# Patient Record
Sex: MALE | Race: BLACK OR AFRICAN AMERICAN | NOT HISPANIC OR LATINO
[De-identification: names, ages, dates, MRNs, and addresses within clinical notes are randomized per-mention and may not be internally consistent; named-entity substitution may affect disease eponyms.]

---

## 2021-04-29 PROBLEM — Z00.00 ENCOUNTER FOR PREVENTIVE HEALTH EXAMINATION: Status: ACTIVE | Noted: 2021-04-29

## 2021-05-04 ENCOUNTER — APPOINTMENT (OUTPATIENT)
Dept: NEPHROLOGY | Facility: CLINIC | Age: 51
End: 2021-05-04
Payer: MEDICAID

## 2021-05-04 VITALS
OXYGEN SATURATION: 97 % | WEIGHT: 171 LBS | RESPIRATION RATE: 62 BRPM | HEART RATE: 16 BPM | BODY MASS INDEX: 24.48 KG/M2 | HEIGHT: 70 IN | DIASTOLIC BLOOD PRESSURE: 74 MMHG | TEMPERATURE: 98 F | SYSTOLIC BLOOD PRESSURE: 122 MMHG

## 2021-05-04 DIAGNOSIS — Z78.9 OTHER SPECIFIED HEALTH STATUS: ICD-10-CM

## 2021-05-04 DIAGNOSIS — E55.9 VITAMIN D DEFICIENCY, UNSPECIFIED: ICD-10-CM

## 2021-05-04 DIAGNOSIS — H91.92 UNSPECIFIED HEARING LOSS, LEFT EAR: ICD-10-CM

## 2021-05-04 DIAGNOSIS — K46.9 UNSPECIFIED ABDOMINAL HERNIA W/OUT OBSTRUCTION OR GANGRENE: ICD-10-CM

## 2021-05-04 DIAGNOSIS — D63.1 CHRONIC KIDNEY DISEASE, UNSPECIFIED: ICD-10-CM

## 2021-05-04 DIAGNOSIS — Z86.69 PERSONAL HISTORY OF OTHER DISEASES OF THE NERVOUS SYSTEM AND SENSE ORGANS: ICD-10-CM

## 2021-05-04 DIAGNOSIS — N18.9 CHRONIC KIDNEY DISEASE, UNSPECIFIED: ICD-10-CM

## 2021-05-04 DIAGNOSIS — Z86.19 PERSONAL HISTORY OF OTHER INFECTIOUS AND PARASITIC DISEASES: ICD-10-CM

## 2021-05-04 DIAGNOSIS — B35.1 TINEA UNGUIUM: ICD-10-CM

## 2021-05-04 PROCEDURE — 99243 OFF/OP CNSLTJ NEW/EST LOW 30: CPT

## 2021-05-04 NOTE — HISTORY OF PRESENT ILLNESS
[FreeTextEntry1] : Maria E Valle is a 50-year old gentleman originally from [place] who is currently referred because of an elevated serum creatinine.  He is here today with his wife, Tiffanie.  Mr. Valle is here because of the following BUN/creatinine trend:  22/1.78 (2018), 25/1.61 (2021).  Mr. Valle has a history of CKD and vitamin D deficiency.  He is hard of hearing.  He has no history of diabetes mellitus, hypertension, heart disease or hyperlipidemia.  he takes no pain medications.  His full ROS is below. \par \par Mr. Valle's mother  when he was 6 years old. She was ill.  His father  in .  He did not know his father is is unaware of his father's medical history. He has one sister,  2 half brothers and 1 half sister from his father.  They are all healthy.  His full sister has hearing difficulty.

## 2021-05-04 NOTE — CONSULT LETTER
[Dear  ___] : Dear  [unfilled], [Consult Letter:] : I had the pleasure of evaluating your patient, [unfilled]. [Please see my note below.] : Please see my note below. [Consult Closing:] : Thank you very much for allowing me to participate in the care of this patient.  If you have any questions, please do not hesitate to contact me. [Sincerely,] : Sincerely, [FreeTextEntry3] : Michael Olguin

## 2021-05-04 NOTE — REVIEW OF SYSTEMS
[Loss Of Hearing] : hearing loss [Shortness Of Breath] : no shortness of breath [Cough] : no cough [SOB on Exertion] : no shortness of breath during exertion [Abdominal Pain] : no abdominal pain [Vomiting] : no vomiting [Constipation] : no constipation [Diarrhea] : no diarrhea [Melena] : no melena [Dysuria] : no dysuria [Incontinence] : no incontinence [Hesitancy] : no urinary hesitancy [Nocturia] : nocturia [As Noted in HPI] : as noted in HPI [Negative] : Endocrine [FreeTextEntry2] : Has had "sweats at night"- he was always "soaking wet when he sleeps"- this resolved around January 2021. Lost "a little weight and then gained weight back over the last 3-4 weeks. [FreeTextEntry4] : since a young age [FreeTextEntry3] : Glaucoma [FreeTextEntry8] : nocturia 3, good stream, no hesitancy [de-identified] : orthostatic dizziness "it doesn't happen frequently"

## 2021-05-04 NOTE — PHYSICAL EXAM
[General Appearance - Alert] : alert [General Appearance - In No Acute Distress] : in no acute distress [General Appearance - Well Nourished] : well nourished [General Appearance - Well Developed] : well developed [General Appearance - Well-Appearing] : healthy appearing [Sclera] : the sclera and conjunctiva were normal [Extraocular Movements] : extraocular movements were intact [Neck Appearance] : the appearance of the neck was normal [] : no respiratory distress [Respiration, Rhythm And Depth] : normal respiratory rhythm and effort [Exaggerated Use Of Accessory Muscles For Inspiration] : no accessory muscle use [Auscultation Breath Sounds / Voice Sounds] : lungs were clear to auscultation bilaterally [Heart Rate And Rhythm] : heart rate was normal and rhythm regular [Heart Sounds] : normal S1 and S2 [Heart Sounds Gallop] : no gallops [Murmurs] : no murmurs [Heart Sounds Pericardial Friction Rub] : no pericardial rub [Edema] : there was no peripheral edema [Full Pulse] : the pedal pulses are present [FreeTextEntry1] : radial pulses 2+ with equal timing [Bowel Sounds] : normal bowel sounds [Abdomen Soft] : soft [No CVA Tenderness] : no ~M costovertebral angle tenderness [Abdomen Tenderness] : non-tender [No Spinal Tenderness] : no spinal tenderness [Abnormal Walk] : normal gait [Nail Clubbing] : no clubbing  or cyanosis of the fingernails [Involuntary Movements] : no involuntary movements were seen [Skin Turgor] : normal skin turgor [No Focal Deficits] : no focal deficits [Oriented To Time, Place, And Person] : oriented to person, place, and time [Impaired Insight] : insight and judgment were intact [Affect] : the affect was normal [Mood] : the mood was normal

## 2021-07-06 ENCOUNTER — APPOINTMENT (OUTPATIENT)
Dept: NEPHROLOGY | Facility: CLINIC | Age: 51
End: 2021-07-06

## 2021-07-12 ENCOUNTER — APPOINTMENT (OUTPATIENT)
Dept: NEPHROLOGY | Facility: CLINIC | Age: 51
End: 2021-07-12
Payer: MEDICAID

## 2021-07-12 VITALS
SYSTOLIC BLOOD PRESSURE: 118 MMHG | RESPIRATION RATE: 57 BRPM | HEIGHT: 60 IN | BODY MASS INDEX: 32.59 KG/M2 | WEIGHT: 166 LBS | DIASTOLIC BLOOD PRESSURE: 74 MMHG | TEMPERATURE: 98 F | HEART RATE: 16 BPM | OXYGEN SATURATION: 100 %

## 2021-07-12 DIAGNOSIS — R61 GENERALIZED HYPERHIDROSIS: ICD-10-CM

## 2021-07-12 DIAGNOSIS — R63.4 ABNORMAL WEIGHT LOSS: ICD-10-CM

## 2021-07-12 DIAGNOSIS — N18.30 CHRONIC KIDNEY DISEASE, STAGE 3 UNSPECIFIED: ICD-10-CM

## 2021-07-12 DIAGNOSIS — R80.9 PROTEINURIA, UNSPECIFIED: ICD-10-CM

## 2021-07-12 PROCEDURE — 99214 OFFICE O/P EST MOD 30 MIN: CPT

## 2021-07-12 NOTE — HISTORY OF PRESENT ILLNESS
[FreeTextEntry1] : Maria E Valle is a 51-year old gentleman originally from [place] who was referred because of an elevated serum creatinine.  He is here today with his wife, Tiffanie.  Mr. Valle is here because of the following BUN/creatinine trend:  22/1.78 (2018), 25/1.61 (2021), 26/1.86 (2021).   Mr. Valle has a history of CKD and vitamin D deficiency.  He is hard of hearing.  He has no history of diabetes mellitus, hypertension, heart disease or hyperlipidemia.  he takes no pain medications. \par \par Mr. Valle's mother  when he was 6 years old. She was ill.  His father  in .  He did not know his father is is unaware of his father's medical history. He has one sister,  2 half brothers and 1 half sister from his father.  They are all healthy.  His full sister has hearing difficulty. \par \par Mr. Valle is here for follow up.  He has remain well since his first visit with me in May 2021.  He was told he has "heart enlargement" at the [facility].  He was then sent to [place] for additional testing. He was then sent to the ED at [facility].  He was told "his heart isn't functioning right".  He has follow up at [facility].\par

## 2021-07-12 NOTE — CONSULT LETTER
[Dear  ___] : Dear  [unfilled], [Consult Letter:] : I had the pleasure of evaluating your patient, [unfilled]. [Please see my note below.] : Please see my note below. [Consult Closing:] : Thank you very much for allowing me to participate in the care of this patient.  If you have any questions, please do not hesitate to contact me. [Sincerely,] : Sincerely, [FreeTextEntry3] : Michael Olguin [DrJon  ___] : Dr. SAENZ

## 2021-07-12 NOTE — REVIEW OF SYSTEMS
[Loss Of Hearing] : hearing loss [Shortness Of Breath] : no shortness of breath [Cough] : no cough [SOB on Exertion] : no shortness of breath during exertion [Abdominal Pain] : no abdominal pain [Vomiting] : no vomiting [Constipation] : no constipation [Diarrhea] : no diarrhea [Melena] : no melena [Dysuria] : no dysuria [Incontinence] : no incontinence [Hesitancy] : no urinary hesitancy [Nocturia] : nocturia [As Noted in HPI] : as noted in HPI [Negative] : Endocrine [FreeTextEntry2] : Has had "sweats at night"- he was always "soaking wet when he sleeps"- this resolved around January 2021. Lost weight since his last vistt with me. [FreeTextEntry3] : Glaucoma [FreeTextEntry4] : since a young age [FreeTextEntry8] : nocturia x 3-4, good stream, no hesitancy [de-identified] : orthostatic dizziness "it doesn't happen frequently"

## 2021-07-12 NOTE — PHYSICAL EXAM
[General Appearance - Alert] : alert [General Appearance - In No Acute Distress] : in no acute distress [General Appearance - Well Nourished] : well nourished [General Appearance - Well Developed] : well developed [General Appearance - Well-Appearing] : healthy appearing [Sclera] : the sclera and conjunctiva were normal [Extraocular Movements] : extraocular movements were intact [Neck Appearance] : the appearance of the neck was normal [] : no respiratory distress [Respiration, Rhythm And Depth] : normal respiratory rhythm and effort [Exaggerated Use Of Accessory Muscles For Inspiration] : no accessory muscle use [Auscultation Breath Sounds / Voice Sounds] : lungs were clear to auscultation bilaterally [Heart Rate And Rhythm] : heart rate was normal and rhythm regular [Heart Sounds] : normal S1 and S2 [Heart Sounds Gallop] : no gallops [Murmurs] : no murmurs [Heart Sounds Pericardial Friction Rub] : no pericardial rub [Full Pulse] : the pedal pulses are present [Edema] : there was no peripheral edema [FreeTextEntry1] : radial pulses 2+ with equal timing [Bowel Sounds] : normal bowel sounds [Abdomen Soft] : soft [Abdomen Tenderness] : non-tender [No CVA Tenderness] : no ~M costovertebral angle tenderness [No Spinal Tenderness] : no spinal tenderness [Abnormal Walk] : normal gait [Involuntary Movements] : no involuntary movements were seen [Skin Turgor] : normal skin turgor [No Focal Deficits] : no focal deficits [Oriented To Time, Place, And Person] : oriented to person, place, and time [Impaired Insight] : insight and judgment were intact [Affect] : the affect was normal [Mood] : the mood was normal

## 2021-07-12 NOTE — ASSESSMENT
[FreeTextEntry1] : Maria E Valle is a 50-year old gentleman originally from [place] who is currently referred for evaluation of  the following BUN/creatinine trend:  22/1.78 (2018), 25/1.61 (2021).  Mr. Valle has a history of CKD and vitamin D deficiency.  He is hard of hearing.  He has no history of diabetes mellitus, hypertension, heart disease or hyperlipidemia.  he takes no pain medications.  Both Mr. Valle and his sister have a history of being hard of hearing.  His mother  at a young age and Mr. Valle did not know his father. \par \par Mr. Valle had a CT scan of the abdomen at [facility] in 2018 which demonstrated a 2.2 cm hypodense lesion in the lower pole of the left kidney representing either a proteinaceous or hemorrhagic cysts.  \par \par His urinalysis demonstrated 2+ proteinuria.\par \par LAB STUDIES (2021):  SPEP (no M-spike), 24-hour urine protein (782 mg, M-spike not observed), Serum immunofixation (unremarkable), hepatitis B surface antigen (-), hepatitis B core antibody total (-), \par \par 24 hour urine protein 2272 mg. creatinine 1854 mg\par \par BUN/creatinine 26/1.86, K+ 4.8 meq/L, CO2 27 meq/L, Na+ 143 meq/L\par \par HbA1c (6.2%)\par \par \par IMPRESSION:\par \par Stage III CKD with proteinuria.  The amount of proteinuria remains  unclear.  On the urine immunofixation there were 782 mg of protein.  On a separate 24 hour urine collection the proteinuria was 2272 mg.  I am unsure what to make of this. The approaches to each of these are quite different. \par \par The renal ultrasound was not done because of cost.  Ms. Valle has found a place where he can get the ultrasound done at a price he is able to afford. \par \par Other missing tests include: Hep C antibody (screen), VIRA, rheumatoid factor.\par \par \par RECOMMENDATIONS:\par \par (1) Renal ultrasound\par (2) Random urine for total protein and creatinine\par (3) Urinalysis\par (4) Hep C antibody\par (5) VIRA\par (6) Rheumatoid factor\par (7) VDRL\par (8) HIV test if appropriate\par (9) PPD +/- quantiferon gold assay\par \par Return following these lab studies\par

## 2021-08-15 DIAGNOSIS — N17.9 ACUTE KIDNEY FAILURE, UNSPECIFIED: ICD-10-CM

## 2021-08-26 ENCOUNTER — APPOINTMENT (OUTPATIENT)
Dept: NEPHROLOGY | Facility: CLINIC | Age: 51
End: 2021-08-26

## 2021-08-26 DIAGNOSIS — N28.89 OTHER SPECIFIED DISORDERS OF KIDNEY AND URETER: ICD-10-CM

## 2021-10-05 DIAGNOSIS — N23 UNSPECIFIED RENAL COLIC: ICD-10-CM

## 2023-01-10 ENCOUNTER — OFFICE (OUTPATIENT)
Dept: URBAN - METROPOLITAN AREA CLINIC 29 | Facility: CLINIC | Age: 53
Setting detail: OPHTHALMOLOGY
End: 2023-01-10

## 2023-01-10 DIAGNOSIS — H57.11: ICD-10-CM

## 2023-01-10 DIAGNOSIS — H40.1212: ICD-10-CM

## 2023-01-10 DIAGNOSIS — H40.002: ICD-10-CM

## 2023-01-10 DIAGNOSIS — H40.031: ICD-10-CM

## 2023-01-10 DIAGNOSIS — H40.032: ICD-10-CM

## 2023-01-10 DIAGNOSIS — H01.001: ICD-10-CM

## 2023-01-10 DIAGNOSIS — H01.004: ICD-10-CM

## 2023-01-10 DIAGNOSIS — H53.19: ICD-10-CM

## 2023-01-10 DIAGNOSIS — H02.402: ICD-10-CM

## 2023-01-10 DIAGNOSIS — H00.11: ICD-10-CM

## 2023-01-10 PROCEDURE — 92020 GONIOSCOPY: CPT | Performed by: OPHTHALMOLOGY

## 2023-01-10 PROCEDURE — 92012 INTRM OPH EXAM EST PATIENT: CPT | Performed by: OPHTHALMOLOGY

## 2023-01-10 ASSESSMENT — REFRACTION_AUTOREFRACTION
OD_CYLINDER: +0.25
OS_SPHERE: +3.25
OS_AXIS: 009
OD_SPHERE: +3.50
OS_CYLINDER: +0.50
OD_AXIS: 034

## 2023-01-10 ASSESSMENT — REFRACTION_MANIFEST
OD_CYLINDER: +1.00
OD_AXIS: 180
OD_VA1: 20/25
OS_ADD: +2.00
OD_SPHERE: +2.50
OS_VA1: 20/20-1
OD_ADD: +2.00
OS_SPHERE: +2.50
OD_VA1: 20/25
OS_VA1: 20/20
OD_ADD: +2.00
OD_AXIS: 180
OS_CYLINDER: +0.50
OS_AXIS: 180
OS_AXIS: 180
OD_SPHERE: +2.50
OS_ADD: +2.00
OS_CYLINDER: +0.50
OS_SPHERE: +3.25
OD_CYLINDER: +0.75

## 2023-01-10 ASSESSMENT — CORNEAL DYSTROPHY - POSTERIOR
OD_POSTERIORDYSTROPHY: T GUTTATA
OS_POSTERIORDYSTROPHY: T GUTTATA

## 2023-01-10 ASSESSMENT — REFRACTION_CURRENTRX
OD_SPHERE: +2.50
OD_OVR_VA: 20/
OS_ADD: +2.00
OS_VPRISM_DIRECTION: PROGS
OS_SPHERE: +2.50
OD_ADD: +2.00
OS_AXIS: 175
OS_OVR_VA: 20/
OD_AXIS: 175
OD_VPRISM_DIRECTION: PROGS
OS_CYLINDER: +0.50
OD_CYLINDER: +0.50

## 2023-01-10 ASSESSMENT — AXIALLENGTH_DERIVED
OS_AL: 23.5765
OD_AL: 23.2544
OS_AL: 23.2883
OD_AL: 23.0202
OD_AL: 23.3018
OS_AL: 23.2883

## 2023-01-10 ASSESSMENT — LID EXAM ASSESSMENTS
OD_BLEPHARITIS: 2+
OS_BLEPHARITIS: 2+

## 2023-01-10 ASSESSMENT — PACHYMETRY
OD_CT_UM: 568
OS_CT_UM: 558
OD_CT_CORRECTION: -1
OS_CT_CORRECTION: -1

## 2023-01-10 ASSESSMENT — SPHEQUIV_DERIVED
OD_SPHEQUIV: 3.625
OD_SPHEQUIV: 3
OS_SPHEQUIV: 3.5
OS_SPHEQUIV: 3.5
OS_SPHEQUIV: 2.75
OD_SPHEQUIV: 2.875

## 2023-01-10 ASSESSMENT — KERATOMETRY
OS_K2POWER_DIOPTERS: 41.00
OD_AXISANGLE_DEGREES: 074
OD_K1POWER_DIOPTERS: 41.00
OS_AXISANGLE_DEGREES: 008
OS_K1POWER_DIOPTERS: 40.25
OD_K2POWER_DIOPTERS: 41.50

## 2023-01-10 ASSESSMENT — CONFRONTATIONAL VISUAL FIELD TEST (CVF)
OD_FINDINGS: FULL
OS_FINDINGS: FULL

## 2023-01-10 ASSESSMENT — VISUAL ACUITY
OS_BCVA: 20/25-2
OD_BCVA: 20/20-2

## 2023-01-10 ASSESSMENT — TONOMETRY
OS_IOP_MMHG: 18
OD_IOP_MMHG: 17

## 2023-01-10 ASSESSMENT — LID POSITION - PTOSIS: OS_PTOSIS: LUL 1+

## 2023-05-02 ENCOUNTER — OFFICE (OUTPATIENT)
Dept: URBAN - METROPOLITAN AREA CLINIC 29 | Facility: CLINIC | Age: 53
Setting detail: OPHTHALMOLOGY
End: 2023-05-02

## 2023-05-02 DIAGNOSIS — H40.1212: ICD-10-CM

## 2023-05-02 DIAGNOSIS — H00.11: ICD-10-CM

## 2023-05-02 DIAGNOSIS — H01.001: ICD-10-CM

## 2023-05-02 DIAGNOSIS — H40.032: ICD-10-CM

## 2023-05-02 DIAGNOSIS — H53.19: ICD-10-CM

## 2023-05-02 DIAGNOSIS — H57.11: ICD-10-CM

## 2023-05-02 DIAGNOSIS — H02.402: ICD-10-CM

## 2023-05-02 DIAGNOSIS — H40.002: ICD-10-CM

## 2023-05-02 DIAGNOSIS — H01.004: ICD-10-CM

## 2023-05-02 DIAGNOSIS — H40.031: ICD-10-CM

## 2023-05-02 PROCEDURE — 99213 OFFICE O/P EST LOW 20 MIN: CPT | Performed by: OPHTHALMOLOGY

## 2023-05-02 PROCEDURE — 92133 CPTRZD OPH DX IMG PST SGM ON: CPT | Performed by: OPHTHALMOLOGY

## 2023-05-02 PROCEDURE — 92083 EXTENDED VISUAL FIELD XM: CPT | Performed by: OPHTHALMOLOGY

## 2023-05-02 ASSESSMENT — KERATOMETRY
OD_AXISANGLE_DEGREES: 074
OS_K2POWER_DIOPTERS: 41.00
OD_K1POWER_DIOPTERS: 41.00
OD_K2POWER_DIOPTERS: 41.50
OS_AXISANGLE_DEGREES: 008
OS_K1POWER_DIOPTERS: 40.25

## 2023-05-02 ASSESSMENT — REFRACTION_AUTOREFRACTION
OD_AXIS: 034
OS_CYLINDER: +0.50
OS_SPHERE: +3.25
OD_SPHERE: +3.50
OS_AXIS: 009
OD_CYLINDER: +0.25

## 2023-05-02 ASSESSMENT — REFRACTION_MANIFEST
OD_SPHERE: +2.50
OS_SPHERE: +2.50
OD_SPHERE: +2.50
OD_CYLINDER: +0.75
OS_CYLINDER: +0.50
OS_CYLINDER: +0.50
OD_VA1: 20/25
OS_VA1: 20/20
OD_CYLINDER: +1.00
OS_VA1: 20/20-1
OD_VA1: 20/25
OS_AXIS: 180
OD_ADD: +2.00
OS_ADD: +2.00
OD_AXIS: 180
OS_ADD: +2.00
OS_AXIS: 180
OS_SPHERE: +3.25
OD_ADD: +2.00
OD_AXIS: 180

## 2023-05-02 ASSESSMENT — REFRACTION_CURRENTRX
OS_AXIS: 175
OD_ADD: +2.00
OS_CYLINDER: +0.50
OS_VPRISM_DIRECTION: PROGS
OD_OVR_VA: 20/
OS_ADD: +2.00
OS_OVR_VA: 20/
OD_VPRISM_DIRECTION: PROGS
OD_AXIS: 175
OS_SPHERE: +2.50
OD_SPHERE: +2.50
OD_CYLINDER: +0.50

## 2023-05-02 ASSESSMENT — LID POSITION - PTOSIS: OS_PTOSIS: LUL 1+

## 2023-05-02 ASSESSMENT — LID EXAM ASSESSMENTS
OS_BLEPHARITIS: 2+
OD_BLEPHARITIS: 2+

## 2023-05-02 ASSESSMENT — TONOMETRY
OD_IOP_MMHG: 19
OS_IOP_MMHG: 19

## 2023-05-02 ASSESSMENT — PACHYMETRY
OS_CT_CORRECTION: -1
OD_CT_UM: 568
OS_CT_UM: 558
OD_CT_CORRECTION: -1

## 2023-05-02 ASSESSMENT — SPHEQUIV_DERIVED
OD_SPHEQUIV: 3
OS_SPHEQUIV: 2.75
OD_SPHEQUIV: 2.875
OS_SPHEQUIV: 3.5
OS_SPHEQUIV: 3.5
OD_SPHEQUIV: 3.625

## 2023-05-02 ASSESSMENT — AXIALLENGTH_DERIVED
OS_AL: 23.5765
OS_AL: 23.2883
OD_AL: 23.0202
OS_AL: 23.2883
OD_AL: 23.3018
OD_AL: 23.2544

## 2023-05-02 ASSESSMENT — VISUAL ACUITY
OS_BCVA: 20/30-2
OD_BCVA: 20/25-1

## 2023-05-02 ASSESSMENT — CONFRONTATIONAL VISUAL FIELD TEST (CVF)
OS_FINDINGS: FULL
OD_FINDINGS: FULL

## 2023-05-02 ASSESSMENT — CORNEAL DYSTROPHY - POSTERIOR
OD_POSTERIORDYSTROPHY: T GUTTATA
OS_POSTERIORDYSTROPHY: T GUTTATA

## 2023-06-20 ENCOUNTER — RX ONLY (RX ONLY)
Age: 53
End: 2023-06-20

## 2023-06-20 ENCOUNTER — OFFICE (OUTPATIENT)
Dept: URBAN - METROPOLITAN AREA CLINIC 29 | Facility: CLINIC | Age: 53
Setting detail: OPHTHALMOLOGY
End: 2023-06-20

## 2023-06-20 DIAGNOSIS — H01.004: ICD-10-CM

## 2023-06-20 DIAGNOSIS — H40.031: ICD-10-CM

## 2023-06-20 DIAGNOSIS — H40.002: ICD-10-CM

## 2023-06-20 DIAGNOSIS — H20.12: ICD-10-CM

## 2023-06-20 DIAGNOSIS — H01.001: ICD-10-CM

## 2023-06-20 DIAGNOSIS — H40.1212: ICD-10-CM

## 2023-06-20 DIAGNOSIS — H40.032: ICD-10-CM

## 2023-06-20 DIAGNOSIS — H02.402: ICD-10-CM

## 2023-06-20 PROBLEM — H01.00 BLEPHARITIS; RIGHT UPPER LID, LEFT UPPER LID: Status: ACTIVE | Noted: 2023-06-20

## 2023-06-20 PROCEDURE — 99213 OFFICE O/P EST LOW 20 MIN: CPT | Performed by: OPHTHALMOLOGY

## 2023-06-20 ASSESSMENT — SPHEQUIV_DERIVED
OD_SPHEQUIV: 2.875
OD_SPHEQUIV: 3
OS_SPHEQUIV: 3.5
OS_SPHEQUIV: 3.5
OS_SPHEQUIV: 3
OS_SPHEQUIV: 2.75
OD_SPHEQUIV: 3.625
OD_SPHEQUIV: 2.75

## 2023-06-20 ASSESSMENT — AXIALLENGTH_DERIVED
OD_AL: 23.3495
OS_AL: 23.2883
OD_AL: 23.2544
OS_AL: 23.4796
OS_AL: 23.2883
OD_AL: 23.3018
OS_AL: 23.5765
OD_AL: 23.0202

## 2023-06-20 ASSESSMENT — PACHYMETRY
OD_CT_CORRECTION: -1
OS_CT_CORRECTION: -1
OD_CT_UM: 568
OS_CT_UM: 558

## 2023-06-20 ASSESSMENT — REFRACTION_CURRENTRX
OD_AXIS: 175
OD_OVR_VA: 20/
OD_ADD: +2.00
OD_CYLINDER: +0.50
OS_OVR_VA: 20/
OS_ADD: +2.00
OD_SPHERE: +2.50
OS_AXIS: 175
OS_VPRISM_DIRECTION: PROGS
OS_SPHERE: +2.50
OS_CYLINDER: +0.50
OD_VPRISM_DIRECTION: PROGS

## 2023-06-20 ASSESSMENT — REFRACTION_MANIFEST
OD_VA1: 20/25
OD_CYLINDER: -0.50
OD_SPHERE: +2.50
OS_AXIS: 180
OS_ADD: +2.00
OS_CYLINDER: +0.50
OD_AXIS: 100
OS_ADD: +2.00
OS_SPHERE: +3.25
OS_SPHERE: +2.50
OD_VA1: 20/25
OD_CYLINDER: +1.00
OD_ADD: +2.00
OS_AXIS: 090
OS_VA1: 20/20
OD_ADD: +2.00
OD_SPHERE: +2.50
OS_SPHERE: +3.25
OS_VA1: 20/20-1
OS_CYLINDER: -0.50
OD_VA1: 20/25
OD_SPHERE: +3.00
OS_VA1: 20/20
OD_AXIS: 180
OS_ADD: +2.00
OD_AXIS: 180
OD_CYLINDER: +0.75
OD_ADD: +2.00
OS_AXIS: 180
OS_CYLINDER: +0.50

## 2023-06-20 ASSESSMENT — REFRACTION_AUTOREFRACTION
OS_CYLINDER: +0.50
OD_SPHERE: +3.50
OS_SPHERE: +3.25
OD_AXIS: 034
OD_CYLINDER: +0.25
OS_AXIS: 009

## 2023-06-20 ASSESSMENT — KERATOMETRY
OD_K1POWER_DIOPTERS: 41.00
OD_AXISANGLE_DEGREES: 074
OS_K2POWER_DIOPTERS: 41.00
OS_AXISANGLE_DEGREES: 008
OS_K1POWER_DIOPTERS: 40.25
OD_K2POWER_DIOPTERS: 41.50

## 2023-06-20 ASSESSMENT — VISUAL ACUITY
OS_BCVA: 20/30-2
OD_BCVA: 20/25-1

## 2023-06-20 ASSESSMENT — LID EXAM ASSESSMENTS
OS_BLEPHARITIS: 2+
OD_BLEPHARITIS: 2+

## 2023-06-20 ASSESSMENT — CONFRONTATIONAL VISUAL FIELD TEST (CVF)
OS_FINDINGS: FULL
OD_FINDINGS: FULL

## 2023-06-20 ASSESSMENT — TONOMETRY
OD_IOP_MMHG: 14
OS_IOP_MMHG: 14

## 2023-06-20 ASSESSMENT — CORNEAL DYSTROPHY - POSTERIOR
OS_POSTERIORDYSTROPHY: T GUTTATA
OD_POSTERIORDYSTROPHY: T GUTTATA

## 2023-06-20 ASSESSMENT — LID POSITION - PTOSIS: OS_PTOSIS: LUL 1+

## 2023-08-07 ENCOUNTER — OFFICE (OUTPATIENT)
Dept: URBAN - METROPOLITAN AREA CLINIC 29 | Facility: CLINIC | Age: 53
Setting detail: OPHTHALMOLOGY
End: 2023-08-07

## 2023-08-07 DIAGNOSIS — H40.002: ICD-10-CM

## 2023-08-07 DIAGNOSIS — H40.032: ICD-10-CM

## 2023-08-07 DIAGNOSIS — H02.402: ICD-10-CM

## 2023-08-07 DIAGNOSIS — H40.1212: ICD-10-CM

## 2023-08-07 DIAGNOSIS — H40.031: ICD-10-CM

## 2023-08-07 PROCEDURE — 92250 FUNDUS PHOTOGRAPHY W/I&R: CPT | Performed by: OPHTHALMOLOGY

## 2023-08-07 PROCEDURE — 99213 OFFICE O/P EST LOW 20 MIN: CPT | Performed by: OPHTHALMOLOGY

## 2023-08-07 ASSESSMENT — VISUAL ACUITY
OD_BCVA: 20/20
OS_BCVA: 20/40+1

## 2023-08-07 ASSESSMENT — AXIALLENGTH_DERIVED
OS_AL: 23.2883
OD_AL: 23.3495
OS_AL: 23.4796
OD_AL: 23.0202
OD_AL: 23.3018
OD_AL: 23.4453
OS_AL: 23.2883
OS_AL: 23.5765
OS_AL: 23.4796
OD_AL: 23.2544

## 2023-08-07 ASSESSMENT — SPHEQUIV_DERIVED
OS_SPHEQUIV: 2.75
OS_SPHEQUIV: 3.5
OD_SPHEQUIV: 3
OD_SPHEQUIV: 2.75
OS_SPHEQUIV: 3.5
OS_SPHEQUIV: 3
OD_SPHEQUIV: 2.875
OD_SPHEQUIV: 3.625
OD_SPHEQUIV: 2.5
OS_SPHEQUIV: 3

## 2023-08-07 ASSESSMENT — CORNEAL DYSTROPHY - POSTERIOR
OD_POSTERIORDYSTROPHY: T GUTTATA
OS_POSTERIORDYSTROPHY: T GUTTATA

## 2023-08-07 ASSESSMENT — REFRACTION_MANIFEST
OD_AXIS: 180
OS_ADD: +2.00
OS_ADD: +2.00
OD_SPHERE: +2.50
OD_ADD: +2.00
OD_AXIS: 180
OD_ADD: +2.00
OS_AXIS: 180
OD_SPHERE: +2.50
OS_VA1: 20/20
OD_AXIS: 010
OS_ADD: +2.00
OS_AXIS: 180
OD_VA1: 20/25
OS_CYLINDER: +0.50
OD_CYLINDER: +0.75
OS_SPHERE: +2.75
OD_SPHERE: +2.50
OS_SPHERE: +2.50
OS_CYLINDER: +0.50
OD_VA1: 20/25
OD_CYLINDER: +0.50
OD_AXIS: 010
OD_VA1: 20/25
OS_SPHERE: +3.25
OD_CYLINDER: +1.00
OS_CYLINDER: +0.50
OS_VA1: 20/20
OS_CYLINDER: +0.50
OD_CYLINDER: +0.50
OS_AXIS: 180
OS_VA1: 20/20-1
OS_ADD: +2.00
OD_ADD: +2.00
OD_VA1: 20/25-1
OD_SPHERE: +2.25
OS_SPHERE: +2.75
OS_VA1: 20/20
OS_AXIS: 180
OD_ADD: +2.00

## 2023-08-07 ASSESSMENT — REFRACTION_CURRENTRX
OS_ADD: +2.00
OD_OVR_VA: 20/
OS_CYLINDER: +0.50
OS_OVR_VA: 20/
OD_SPHERE: +2.50
OD_ADD: +2.00
OD_VPRISM_DIRECTION: PROGS
OS_VPRISM_DIRECTION: PROGS
OD_AXIS: 175
OD_CYLINDER: +0.50
OS_SPHERE: +2.50
OS_AXIS: 175

## 2023-08-07 ASSESSMENT — PACHYMETRY
OD_CT_UM: 568
OD_CT_CORRECTION: -1
OS_CT_UM: 558
OS_CT_CORRECTION: -1

## 2023-08-07 ASSESSMENT — REFRACTION_AUTOREFRACTION
OS_CYLINDER: +0.50
OD_SPHERE: +3.50
OS_SPHERE: +3.25
OD_CYLINDER: +0.25
OS_AXIS: 009
OD_AXIS: 034

## 2023-08-07 ASSESSMENT — LID POSITION - PTOSIS: OS_PTOSIS: LUL 1+

## 2023-08-07 ASSESSMENT — TONOMETRY
OD_IOP_MMHG: 15
OS_IOP_MMHG: 14

## 2023-08-07 ASSESSMENT — CONFRONTATIONAL VISUAL FIELD TEST (CVF)
OS_FINDINGS: FULL
OD_FINDINGS: FULL

## 2023-08-07 ASSESSMENT — KERATOMETRY
OS_K1POWER_DIOPTERS: 40.25
OS_K2POWER_DIOPTERS: 41.00
OD_K1POWER_DIOPTERS: 41.00
OS_AXISANGLE_DEGREES: 008
OD_K2POWER_DIOPTERS: 41.50
OD_AXISANGLE_DEGREES: 074

## 2023-08-07 ASSESSMENT — LID EXAM ASSESSMENTS
OD_BLEPHARITIS: 2+
OS_BLEPHARITIS: 2+

## 2023-12-11 ENCOUNTER — OFFICE (OUTPATIENT)
Dept: URBAN - METROPOLITAN AREA CLINIC 29 | Facility: CLINIC | Age: 53
Setting detail: OPHTHALMOLOGY
End: 2023-12-11

## 2023-12-11 DIAGNOSIS — H40.033: ICD-10-CM

## 2023-12-11 DIAGNOSIS — H40.031: ICD-10-CM

## 2023-12-11 DIAGNOSIS — H40.032: ICD-10-CM

## 2023-12-11 DIAGNOSIS — H02.402: ICD-10-CM

## 2023-12-11 DIAGNOSIS — H40.2212: ICD-10-CM

## 2023-12-11 DIAGNOSIS — H40.2221: ICD-10-CM

## 2023-12-11 PROCEDURE — 99212 OFFICE O/P EST SF 10 MIN: CPT | Performed by: OPHTHALMOLOGY

## 2023-12-11 PROCEDURE — 92020 GONIOSCOPY: CPT | Performed by: OPHTHALMOLOGY

## 2023-12-11 ASSESSMENT — REFRACTION_MANIFEST
OD_CYLINDER: +0.50
OD_CYLINDER: +0.50
OS_AXIS: 180
OD_VA1: 20/25
OS_AXIS: 180
OS_VA1: 20/20
OD_SPHERE: +2.25
OS_SPHERE: +3.25
OD_ADD: +2.00
OD_AXIS: 010
OD_SPHERE: +2.50
OS_CYLINDER: +0.50
OS_ADD: +2.00
OS_VA1: 20/20-1
OD_VA1: 20/25
OS_SPHERE: +2.75
OS_SPHERE: +2.75
OD_ADD: +2.00
OS_SPHERE: +2.50
OS_AXIS: 180
OS_CYLINDER: +0.50
OD_AXIS: 180
OD_VA1: 20/25
OS_ADD: +2.00
OS_CYLINDER: +0.50
OS_VA1: 20/20
OS_ADD: +2.00
OS_AXIS: 180
OD_VA1: 20/25-1
OD_ADD: +2.00
OD_CYLINDER: +0.75
OD_AXIS: 010
OS_ADD: +2.00
OS_CYLINDER: +0.50
OD_AXIS: 180
OD_SPHERE: +2.50
OD_SPHERE: +2.50
OS_VA1: 20/20
OD_CYLINDER: +1.00
OD_ADD: +2.00

## 2023-12-11 ASSESSMENT — CORNEAL DYSTROPHY - POSTERIOR
OS_POSTERIORDYSTROPHY: T GUTTATA
OD_POSTERIORDYSTROPHY: T GUTTATA

## 2023-12-11 ASSESSMENT — SPHEQUIV_DERIVED
OD_SPHEQUIV: 2.5
OD_SPHEQUIV: 3
OS_SPHEQUIV: 3
OS_SPHEQUIV: 3
OD_SPHEQUIV: 2.875
OD_SPHEQUIV: 3.625
OS_SPHEQUIV: 2.75
OS_SPHEQUIV: 3.5
OS_SPHEQUIV: 3.5
OD_SPHEQUIV: 2.75

## 2023-12-11 ASSESSMENT — REFRACTION_AUTOREFRACTION
OD_AXIS: 034
OS_SPHERE: +3.25
OD_SPHERE: +3.50
OD_CYLINDER: +0.25
OS_CYLINDER: +0.50
OS_AXIS: 009

## 2023-12-11 ASSESSMENT — REFRACTION_CURRENTRX
OS_SPHERE: +2.50
OS_ADD: +2.00
OD_ADD: +2.00
OD_SPHERE: +2.50
OS_AXIS: 175
OD_CYLINDER: +0.50
OS_OVR_VA: 20/
OD_OVR_VA: 20/
OS_VPRISM_DIRECTION: PROGS
OD_VPRISM_DIRECTION: PROGS
OS_CYLINDER: +0.50
OD_AXIS: 175

## 2023-12-11 ASSESSMENT — LID POSITION - PTOSIS: OS_PTOSIS: LUL 1+

## 2023-12-11 ASSESSMENT — CONFRONTATIONAL VISUAL FIELD TEST (CVF)
OS_FINDINGS: FULL
OD_FINDINGS: FULL

## 2023-12-11 ASSESSMENT — LID EXAM ASSESSMENTS
OD_BLEPHARITIS: 2+
OS_BLEPHARITIS: 2+

## 2024-02-15 ENCOUNTER — OFFICE (OUTPATIENT)
Dept: URBAN - METROPOLITAN AREA CLINIC 29 | Facility: CLINIC | Age: 54
Setting detail: OPHTHALMOLOGY
End: 2024-02-15

## 2024-02-15 DIAGNOSIS — H40.2212: ICD-10-CM

## 2024-02-15 PROCEDURE — 66761 REVISION OF IRIS: CPT | Mod: RT | Performed by: OPHTHALMOLOGY

## 2024-02-15 ASSESSMENT — REFRACTION_CURRENTRX
OS_CYLINDER: +0.25
OS_CYLINDER: +0.50
OD_AXIS: 3
OD_AXIS: 175
OS_AXIS: 175
OS_SPHERE: +2.50
OS_ADD: +1.75
OD_ADD: +2.00
OD_VPRISM_DIRECTION: PROGS
OS_OVR_VA: 20/
OD_ADD: +1.75
OS_AXIS: 173
OD_OVR_VA: 20/
OD_CYLINDER: +0.50
OD_SPHERE: +2.50
OD_OVR_VA: 20/
OS_SPHERE: +2.75
OD_CYLINDER: +0.25
OS_ADD: +2.00
OS_OVR_VA: 20/
OS_VPRISM_DIRECTION: PROGS
OD_SPHERE: +2.50

## 2024-02-15 ASSESSMENT — REFRACTION_MANIFEST
OD_AXIS: 180
OD_CYLINDER: +0.50
OD_CYLINDER: +0.75
OS_AXIS: 180
OS_ADD: +2.00
OS_SPHERE: +2.50
OS_AXIS: 180
OD_ADD: +2.00
OS_VA1: 20/20
OD_VA1: 20/25
OS_SPHERE: +2.75
OS_CYLINDER: +0.50
OS_ADD: +2.00
OD_SPHERE: +2.50
OD_SPHERE: +2.50
OD_ADD: +2.00
OS_CYLINDER: +0.50
OS_SPHERE: +2.75
OS_AXIS: 180
OD_CYLINDER: +1.00
OS_CYLINDER: +0.50
OD_SPHERE: +2.50
OD_VA1: 20/25
OD_SPHERE: +2.25
OD_AXIS: 010
OS_CYLINDER: +0.50
OS_SPHERE: +3.25
OD_CYLINDER: +0.50
OD_VA1: 20/25
OD_AXIS: 010
OD_ADD: +2.00
OS_AXIS: 180
OS_VA1: 20/20-1
OS_VA1: 20/20
OS_ADD: +2.00
OD_ADD: +2.00
OS_VA1: 20/20
OS_ADD: +2.00
OD_VA1: 20/25-1
OD_AXIS: 180

## 2024-02-15 ASSESSMENT — LID POSITION - PTOSIS: OS_PTOSIS: LUL 1+

## 2024-02-15 ASSESSMENT — SPHEQUIV_DERIVED
OS_SPHEQUIV: 3
OD_SPHEQUIV: 3.375
OS_SPHEQUIV: 3.75
OS_SPHEQUIV: 3.5
OD_SPHEQUIV: 2.875
OS_SPHEQUIV: 3
OD_SPHEQUIV: 2.75
OD_SPHEQUIV: 2.5
OS_SPHEQUIV: 2.75
OD_SPHEQUIV: 3

## 2024-02-15 ASSESSMENT — CONFRONTATIONAL VISUAL FIELD TEST (CVF)
OS_FINDINGS: FULL
OD_FINDINGS: FULL

## 2024-02-15 ASSESSMENT — REFRACTION_AUTOREFRACTION
OD_SPHERE: +3.25
OS_CYLINDER: +0.50
OS_AXIS: 12
OS_SPHERE: +3.50
OD_AXIS: 178
OD_CYLINDER: +0.25

## 2024-02-15 ASSESSMENT — LID EXAM ASSESSMENTS
OS_BLEPHARITIS: 2+
OD_BLEPHARITIS: 2+

## 2024-02-15 ASSESSMENT — CORNEAL DYSTROPHY - POSTERIOR
OS_POSTERIORDYSTROPHY: T GUTTATA
OD_POSTERIORDYSTROPHY: T GUTTATA

## 2024-03-05 ENCOUNTER — OFFICE (OUTPATIENT)
Dept: URBAN - METROPOLITAN AREA CLINIC 29 | Facility: CLINIC | Age: 54
Setting detail: OPHTHALMOLOGY
End: 2024-03-05

## 2024-03-05 DIAGNOSIS — H40.2221: ICD-10-CM

## 2024-03-05 DIAGNOSIS — H02.402: ICD-10-CM

## 2024-03-05 DIAGNOSIS — H40.031: ICD-10-CM

## 2024-03-05 DIAGNOSIS — H40.032: ICD-10-CM

## 2024-03-05 DIAGNOSIS — H40.2212: ICD-10-CM

## 2024-03-05 PROCEDURE — 92012 INTRM OPH EXAM EST PATIENT: CPT | Performed by: OPHTHALMOLOGY

## 2024-03-05 ASSESSMENT — REFRACTION_MANIFEST
OD_VA1: 20/25
OS_AXIS: 180
OD_SPHERE: +2.50
OS_SPHERE: +2.75
OD_ADD: +2.00
OD_ADD: +2.00
OS_CYLINDER: +0.50
OS_SPHERE: +2.75
OD_AXIS: 180
OD_AXIS: 010
OS_CYLINDER: +0.50
OS_ADD: +2.00
OS_VA1: 20/20
OD_CYLINDER: +0.50
OS_AXIS: 180
OS_CYLINDER: +0.50
OD_SPHERE: +2.25
OS_AXIS: 180
OD_AXIS: 180
OS_ADD: +2.00
OD_VA1: 20/25
OS_VA1: 20/20
OS_AXIS: 180
OS_SPHERE: +2.50
OS_SPHERE: +3.25
OD_VA1: 20/25-1
OD_CYLINDER: +1.00
OS_CYLINDER: +0.50
OS_VA1: 20/20
OD_ADD: +2.00
OS_ADD: +2.00
OD_ADD: +2.00
OD_CYLINDER: +0.50
OD_AXIS: 010
OD_SPHERE: +2.50
OD_VA1: 20/25
OS_ADD: +2.00
OD_SPHERE: +2.50
OD_CYLINDER: +0.75
OS_VA1: 20/20-1

## 2024-03-05 ASSESSMENT — LID EXAM ASSESSMENTS
OD_BLEPHARITIS: 2+
OS_BLEPHARITIS: 2+

## 2024-03-05 ASSESSMENT — REFRACTION_CURRENTRX
OD_OVR_VA: 20/
OD_ADD: +1.75
OD_AXIS: 3
OS_ADD: +2.00
OD_CYLINDER: +0.25
OS_AXIS: 175
OS_VPRISM_DIRECTION: PROGS
OS_SPHERE: +2.75
OD_OVR_VA: 20/
OS_CYLINDER: +0.25
OD_CYLINDER: +0.50
OS_ADD: +1.75
OS_OVR_VA: 20/
OD_AXIS: 175
OD_SPHERE: +2.50
OD_SPHERE: +2.50
OS_OVR_VA: 20/
OD_VPRISM_DIRECTION: PROGS
OS_CYLINDER: +0.50
OS_AXIS: 173
OS_SPHERE: +2.50
OD_ADD: +2.00

## 2024-03-05 ASSESSMENT — SPHEQUIV_DERIVED
OS_SPHEQUIV: 3
OS_SPHEQUIV: 3
OD_SPHEQUIV: 2.75
OD_SPHEQUIV: 2.5
OS_SPHEQUIV: 2.75
OD_SPHEQUIV: 2.875
OS_SPHEQUIV: 3.5
OD_SPHEQUIV: 3

## 2024-03-05 ASSESSMENT — LID POSITION - PTOSIS: OS_PTOSIS: LUL 1+

## 2024-06-07 ENCOUNTER — OFFICE (OUTPATIENT)
Dept: URBAN - METROPOLITAN AREA CLINIC 29 | Facility: CLINIC | Age: 54
Setting detail: OPHTHALMOLOGY
End: 2024-06-07

## 2024-06-07 DIAGNOSIS — H02.402: ICD-10-CM

## 2024-06-07 DIAGNOSIS — H40.2212: ICD-10-CM

## 2024-06-07 DIAGNOSIS — H40.2221: ICD-10-CM

## 2024-06-07 PROCEDURE — 92083 EXTENDED VISUAL FIELD XM: CPT | Performed by: OPHTHALMOLOGY

## 2024-06-07 PROCEDURE — 99213 OFFICE O/P EST LOW 20 MIN: CPT | Performed by: OPHTHALMOLOGY

## 2024-06-07 ASSESSMENT — CONFRONTATIONAL VISUAL FIELD TEST (CVF)
OS_FINDINGS: FULL
OD_FINDINGS: FULL

## 2024-06-07 ASSESSMENT — LID EXAM ASSESSMENTS
OD_BLEPHARITIS: 2+
OS_BLEPHARITIS: 2+

## 2024-06-07 ASSESSMENT — LID POSITION - PTOSIS: OS_PTOSIS: LUL 1+

## 2024-07-22 ENCOUNTER — RX ONLY (RX ONLY)
Age: 54
End: 2024-07-22

## 2024-07-22 ENCOUNTER — OFFICE (OUTPATIENT)
Dept: URBAN - METROPOLITAN AREA CLINIC 29 | Facility: CLINIC | Age: 54
Setting detail: OPHTHALMOLOGY
End: 2024-07-22

## 2024-07-22 DIAGNOSIS — H40.2221: ICD-10-CM

## 2024-07-22 DIAGNOSIS — H01.002: ICD-10-CM

## 2024-07-22 DIAGNOSIS — H40.2212: ICD-10-CM

## 2024-07-22 DIAGNOSIS — H02.402: ICD-10-CM

## 2024-07-22 DIAGNOSIS — H01.005: ICD-10-CM

## 2024-07-22 PROCEDURE — 92012 INTRM OPH EXAM EST PATIENT: CPT | Performed by: OPHTHALMOLOGY

## 2024-07-22 ASSESSMENT — LID POSITION - PTOSIS: OS_PTOSIS: LUL 2+

## 2024-07-22 ASSESSMENT — CONFRONTATIONAL VISUAL FIELD TEST (CVF)
OD_FINDINGS: FULL
OS_FINDINGS: FULL

## 2024-07-22 ASSESSMENT — LID EXAM ASSESSMENTS
OS_BLEPHARITIS: 2+
OD_BLEPHARITIS: 2+

## 2024-10-08 ENCOUNTER — OFFICE (OUTPATIENT)
Dept: URBAN - METROPOLITAN AREA CLINIC 29 | Facility: CLINIC | Age: 54
Setting detail: OPHTHALMOLOGY
End: 2024-10-08

## 2024-10-08 DIAGNOSIS — H01.002: ICD-10-CM

## 2024-10-08 DIAGNOSIS — H40.2212: ICD-10-CM

## 2024-10-08 DIAGNOSIS — H40.2221: ICD-10-CM

## 2024-10-08 DIAGNOSIS — H01.005: ICD-10-CM

## 2024-10-08 DIAGNOSIS — H02.402: ICD-10-CM

## 2024-10-08 PROCEDURE — 99213 OFFICE O/P EST LOW 20 MIN: CPT | Performed by: OPHTHALMOLOGY

## 2024-10-08 PROCEDURE — 92083 EXTENDED VISUAL FIELD XM: CPT | Performed by: OPHTHALMOLOGY

## 2024-10-08 ASSESSMENT — PACHYMETRY
OS_CT_UM: 558
OD_CT_UM: 568
OS_CT_CORRECTION: -1
OD_CT_CORRECTION: -1

## 2024-10-08 ASSESSMENT — REFRACTION_MANIFEST
OD_AXIS: 180
OS_VA1: 20/20
OS_CYLINDER: +0.50
OS_VA1: 20/20
OD_ADD: +2.00
OD_AXIS: 010
OS_AXIS: 180
OS_ADD: +2.00
OD_ADD: +2.00
OD_VA1: 20/25-1
OD_SPHERE: +2.50
OS_ADD: +2.00
OD_SPHERE: +2.50
OD_CYLINDER: +0.50
OD_SPHERE: +2.25
OS_SPHERE: +2.75
OD_ADD: +2.00
OS_VA1: 20/20
OD_VA1: 20/25
OD_SPHERE: +2.50
OS_SPHERE: +2.50
OD_AXIS: 010
OD_ADD: +2.00
OD_AXIS: 180
OS_AXIS: 180
OD_CYLINDER: +0.50
OS_ADD: +2.00
OD_VA1: 20/25
OS_AXIS: 180
OS_ADD: +2.00
OS_SPHERE: +3.25
OS_CYLINDER: +0.50
OD_VA1: 20/25
OS_AXIS: 180
OD_CYLINDER: +1.00
OS_SPHERE: +2.75
OS_CYLINDER: +0.50
OS_VA1: 20/20-1
OS_CYLINDER: +0.50
OD_CYLINDER: +0.75

## 2024-10-08 ASSESSMENT — KERATOMETRY
OD_K2POWER_DIOPTERS: 41.50
OS_K2POWER_DIOPTERS: 41.00
OS_K1POWER_DIOPTERS: 40.25
OD_K1POWER_DIOPTERS: 41.00
OD_AXISANGLE_DEGREES: 074
OS_AXISANGLE_DEGREES: 008

## 2024-10-08 ASSESSMENT — REFRACTION_CURRENTRX
OD_ADD: +2.00
OS_AXIS: 173
OD_OVR_VA: 20/
OD_CYLINDER: +0.50
OS_ADD: +2.00
OD_AXIS: 3
OD_VPRISM_DIRECTION: PROGS
OD_SPHERE: +2.50
OD_OVR_VA: 20/
OS_SPHERE: +2.50
OD_ADD: +1.75
OS_CYLINDER: +0.50
OD_CYLINDER: +0.25
OS_AXIS: 175
OS_OVR_VA: 20/
OS_ADD: +1.75
OS_CYLINDER: +0.25
OD_SPHERE: +2.50
OS_SPHERE: +2.75
OS_VPRISM_DIRECTION: PROGS
OD_AXIS: 175
OS_OVR_VA: 20/

## 2024-10-08 ASSESSMENT — REFRACTION_AUTOREFRACTION
OD_CYLINDER: +0.25
OS_AXIS: 12
OD_AXIS: 178
OD_SPHERE: +3.25
OS_CYLINDER: +0.50
OS_SPHERE: +3.50

## 2024-10-08 ASSESSMENT — CORNEAL DYSTROPHY - POSTERIOR
OS_POSTERIORDYSTROPHY: T GUTTATA
OD_POSTERIORDYSTROPHY: T GUTTATA

## 2024-10-08 ASSESSMENT — CONFRONTATIONAL VISUAL FIELD TEST (CVF)
OD_FINDINGS: FULL
OS_FINDINGS: FULL

## 2024-10-08 ASSESSMENT — LID EXAM ASSESSMENTS
OS_BLEPHARITIS: 2+
OD_BLEPHARITIS: 2+

## 2024-10-08 ASSESSMENT — VISUAL ACUITY
OD_BCVA: 20/20-1
OS_BCVA: 20/30-2

## 2024-10-08 ASSESSMENT — LID POSITION - PTOSIS: OS_PTOSIS: LUL 2+

## 2025-02-13 ENCOUNTER — OFFICE (OUTPATIENT)
Dept: URBAN - METROPOLITAN AREA CLINIC 29 | Facility: CLINIC | Age: 55
Setting detail: OPHTHALMOLOGY
End: 2025-02-13

## 2025-02-13 ENCOUNTER — RX ONLY (RX ONLY)
Age: 55
End: 2025-02-13

## 2025-02-13 DIAGNOSIS — H40.2212: ICD-10-CM

## 2025-02-13 PROCEDURE — 65855 TRABECULOPLASTY LASER SURG: CPT | Mod: RT | Performed by: OPHTHALMOLOGY

## 2025-02-13 ASSESSMENT — REFRACTION_MANIFEST
OS_SPHERE: +2.50
OS_AXIS: 180
OS_CYLINDER: +0.50
OD_ADD: +2.00
OS_VA1: 20/20-1
OD_CYLINDER: +0.50
OD_ADD: +2.00
OD_CYLINDER: +0.50
OD_VA1: 20/25
OD_AXIS: 010
OS_CYLINDER: +0.50
OS_AXIS: 180
OS_VA1: 20/20
OS_SPHERE: +2.75
OD_CYLINDER: +0.75
OS_ADD: +2.00
OS_AXIS: 180
OS_SPHERE: +2.75
OS_VA1: 20/20
OS_VA1: 20/20
OD_ADD: +2.00
OS_SPHERE: +3.25
OD_SPHERE: +2.50
OD_AXIS: 180
OD_AXIS: 010
OD_VA1: 20/25
OS_ADD: +2.00
OD_VA1: 20/25
OD_VA1: 20/25-1
OD_ADD: +2.00
OD_SPHERE: +2.25
OD_CYLINDER: +1.00
OD_SPHERE: +2.50
OS_ADD: +2.00
OS_CYLINDER: +0.50
OD_AXIS: 180
OS_AXIS: 180
OS_ADD: +2.00
OD_SPHERE: +2.50
OS_CYLINDER: +0.50

## 2025-02-13 ASSESSMENT — REFRACTION_CURRENTRX
OD_AXIS: 175
OD_SPHERE: +2.50
OD_SPHERE: +2.50
OD_ADD: +2.00
OD_OVR_VA: 20/
OS_OVR_VA: 20/
OD_CYLINDER: +0.50
OS_VPRISM_DIRECTION: PROGS
OD_AXIS: 3
OS_SPHERE: +2.75
OS_AXIS: 173
OS_OVR_VA: 20/
OS_CYLINDER: +0.25
OD_VPRISM_DIRECTION: PROGS
OS_SPHERE: +2.50
OD_ADD: +1.75
OS_AXIS: 175
OS_ADD: +1.75
OS_ADD: +2.00
OS_CYLINDER: +0.50
OD_OVR_VA: 20/
OD_CYLINDER: +0.25

## 2025-02-13 ASSESSMENT — CONFRONTATIONAL VISUAL FIELD TEST (CVF)
OS_FINDINGS: FULL
OD_FINDINGS: FULL

## 2025-02-13 ASSESSMENT — REFRACTION_AUTOREFRACTION
OD_CYLINDER: +0.25
OD_SPHERE: +3.25
OD_AXIS: 178
OS_CYLINDER: +0.50
OS_AXIS: 12
OS_SPHERE: +3.50

## 2025-02-13 ASSESSMENT — KERATOMETRY
OD_K1POWER_DIOPTERS: 41.00
OS_K1POWER_DIOPTERS: 40.25
OD_AXISANGLE_DEGREES: 074
OD_K2POWER_DIOPTERS: 41.50
OS_K2POWER_DIOPTERS: 41.00
OS_AXISANGLE_DEGREES: 008

## 2025-02-13 ASSESSMENT — PACHYMETRY
OD_CT_CORRECTION: -1
OS_CT_UM: 558
OS_CT_CORRECTION: -1
OD_CT_UM: 568

## 2025-02-13 ASSESSMENT — TONOMETRY
OD_IOP_MMHG: 16
OS_IOP_MMHG: 18

## 2025-02-13 ASSESSMENT — CORNEAL DYSTROPHY - POSTERIOR
OS_POSTERIORDYSTROPHY: T GUTTATA
OD_POSTERIORDYSTROPHY: T GUTTATA

## 2025-02-13 ASSESSMENT — VISUAL ACUITY
OD_BCVA: 20/20-1
OS_BCVA: 20/30-2

## 2025-02-13 ASSESSMENT — LID EXAM ASSESSMENTS
OD_BLEPHARITIS: 2+
OS_BLEPHARITIS: 2+

## 2025-02-13 ASSESSMENT — LID POSITION - PTOSIS: OS_PTOSIS: LUL 2+

## 2025-03-06 ENCOUNTER — OFFICE (OUTPATIENT)
Dept: URBAN - METROPOLITAN AREA CLINIC 29 | Facility: CLINIC | Age: 55
Setting detail: OPHTHALMOLOGY
End: 2025-03-06

## 2025-03-06 DIAGNOSIS — H40.2221: ICD-10-CM

## 2025-03-06 PROCEDURE — 65855 TRABECULOPLASTY LASER SURG: CPT | Mod: LT | Performed by: OPHTHALMOLOGY

## 2025-03-06 ASSESSMENT — REFRACTION_CURRENTRX
OD_SPHERE: +2.50
OD_VPRISM_DIRECTION: PROGS
OS_CYLINDER: +0.25
OD_OVR_VA: 20/
OS_ADD: +1.75
OS_OVR_VA: 20/
OD_AXIS: 175
OS_ADD: +2.00
OD_ADD: +2.00
OS_SPHERE: +2.75
OS_OVR_VA: 20/
OD_ADD: +1.75
OS_AXIS: 175
OD_CYLINDER: +0.25
OD_CYLINDER: +0.50
OS_SPHERE: +2.50
OD_SPHERE: +2.50
OS_AXIS: 173
OD_OVR_VA: 20/
OS_VPRISM_DIRECTION: PROGS
OS_CYLINDER: +0.50
OD_AXIS: 3

## 2025-03-06 ASSESSMENT — REFRACTION_MANIFEST
OS_CYLINDER: +0.50
OS_CYLINDER: +0.50
OS_VA1: 20/20
OD_ADD: +2.00
OS_AXIS: 180
OS_AXIS: 180
OD_VA1: 20/25
OS_SPHERE: +2.75
OD_SPHERE: +2.25
OD_CYLINDER: +0.50
OS_SPHERE: +2.75
OS_ADD: +2.00
OD_SPHERE: +2.50
OD_VA1: 20/25-1
OS_VA1: 20/20-1
OD_CYLINDER: +0.75
OS_VA1: 20/20
OD_ADD: +2.00
OS_ADD: +2.00
OS_SPHERE: +2.50
OD_CYLINDER: +0.50
OS_CYLINDER: +0.50
OD_AXIS: 180
OS_CYLINDER: +0.50
OS_AXIS: 180
OD_AXIS: 010
OS_ADD: +2.00
OS_AXIS: 180
OD_AXIS: 010
OD_AXIS: 180
OS_VA1: 20/20
OD_ADD: +2.00
OD_SPHERE: +2.50
OD_VA1: 20/25
OS_ADD: +2.00
OD_CYLINDER: +1.00
OD_ADD: +2.00
OD_VA1: 20/25
OS_SPHERE: +3.25
OD_SPHERE: +2.50

## 2025-03-06 ASSESSMENT — CONFRONTATIONAL VISUAL FIELD TEST (CVF)
OD_FINDINGS: FULL
OS_FINDINGS: FULL

## 2025-03-06 ASSESSMENT — REFRACTION_AUTOREFRACTION
OD_AXIS: 178
OS_AXIS: 12
OD_SPHERE: +3.25
OS_CYLINDER: +0.50
OD_CYLINDER: +0.25
OS_SPHERE: +3.50

## 2025-03-06 ASSESSMENT — TONOMETRY
OD_IOP_MMHG: 12
OS_IOP_MMHG: 14

## 2025-03-06 ASSESSMENT — PACHYMETRY
OD_CT_CORRECTION: -1
OS_CT_UM: 558
OS_CT_CORRECTION: -1
OD_CT_UM: 568

## 2025-03-06 ASSESSMENT — KERATOMETRY
OS_AXISANGLE_DEGREES: 008
OS_K1POWER_DIOPTERS: 40.25
OD_K2POWER_DIOPTERS: 41.50
OD_AXISANGLE_DEGREES: 074
OD_K1POWER_DIOPTERS: 41.00
OS_K2POWER_DIOPTERS: 41.00

## 2025-03-06 ASSESSMENT — LID EXAM ASSESSMENTS
OD_BLEPHARITIS: 2+
OS_BLEPHARITIS: 2+

## 2025-03-06 ASSESSMENT — CORNEAL DYSTROPHY - POSTERIOR
OD_POSTERIORDYSTROPHY: T GUTTATA
OS_POSTERIORDYSTROPHY: T GUTTATA

## 2025-03-06 ASSESSMENT — VISUAL ACUITY
OS_BCVA: 20/40
OD_BCVA: 20/20-2

## 2025-03-06 ASSESSMENT — LID POSITION - PTOSIS: OS_PTOSIS: LUL 2+

## 2025-03-20 ENCOUNTER — OFFICE (OUTPATIENT)
Dept: URBAN - METROPOLITAN AREA CLINIC 29 | Facility: CLINIC | Age: 55
Setting detail: OPHTHALMOLOGY
End: 2025-03-20

## 2025-03-20 DIAGNOSIS — H16.223: ICD-10-CM

## 2025-03-20 DIAGNOSIS — H40.2221: ICD-10-CM

## 2025-03-20 DIAGNOSIS — H01.002: ICD-10-CM

## 2025-03-20 DIAGNOSIS — H02.402: ICD-10-CM

## 2025-03-20 DIAGNOSIS — H40.2212: ICD-10-CM

## 2025-03-20 DIAGNOSIS — H01.005: ICD-10-CM

## 2025-03-20 PROCEDURE — 99213 OFFICE O/P EST LOW 20 MIN: CPT | Performed by: OPHTHALMOLOGY

## 2025-03-20 ASSESSMENT — REFRACTION_MANIFEST
OD_ADD: +2.00
OS_SPHERE: +2.75
OS_CYLINDER: +0.50
OS_SPHERE: +2.75
OD_AXIS: 010
OS_AXIS: 180
OS_SPHERE: +3.25
OD_VA1: 20/25
OD_SPHERE: +2.25
OD_SPHERE: +2.50
OD_ADD: +2.00
OS_AXIS: 180
OS_AXIS: 180
OD_CYLINDER: +0.50
OS_VA1: 20/20
OS_ADD: +2.00
OD_VA1: 20/25
OD_ADD: +2.00
OS_SPHERE: +2.50
OS_CYLINDER: +0.50
OD_CYLINDER: +0.75
OS_AXIS: 180
OD_SPHERE: +2.50
OS_ADD: +2.00
OD_CYLINDER: +0.50
OS_VA1: 20/20
OS_CYLINDER: +0.50
OD_AXIS: 180
OD_CYLINDER: +1.00
OS_CYLINDER: +0.50
OD_ADD: +2.00
OD_VA1: 20/25
OS_ADD: +2.00
OD_AXIS: 180
OD_VA1: 20/25-1
OD_SPHERE: +2.50
OD_AXIS: 010
OS_ADD: +2.00
OS_VA1: 20/20-1
OS_VA1: 20/20

## 2025-03-20 ASSESSMENT — VISUAL ACUITY
OS_BCVA: 20/25+1
OD_BCVA: 20/20-1

## 2025-03-20 ASSESSMENT — REFRACTION_CURRENTRX
OS_SPHERE: +2.50
OD_OVR_VA: 20/
OD_AXIS: 175
OS_CYLINDER: +0.50
OD_SPHERE: +2.50
OD_ADD: +2.00
OD_ADD: +1.75
OD_OVR_VA: 20/
OS_AXIS: 173
OS_VPRISM_DIRECTION: PROGS
OS_CYLINDER: +0.25
OD_CYLINDER: +0.50
OD_SPHERE: +2.50
OS_SPHERE: +2.75
OD_AXIS: 3
OD_VPRISM_DIRECTION: PROGS
OS_AXIS: 175
OS_OVR_VA: 20/
OS_ADD: +1.75
OD_CYLINDER: +0.25
OS_ADD: +2.00
OS_OVR_VA: 20/

## 2025-03-20 ASSESSMENT — LID EXAM ASSESSMENTS
OS_BLEPHARITIS: 2+
OD_BLEPHARITIS: 2+

## 2025-03-20 ASSESSMENT — REFRACTION_AUTOREFRACTION
OD_AXIS: 178
OD_SPHERE: +3.25
OD_CYLINDER: +0.25
OS_AXIS: 12
OS_CYLINDER: +0.50
OS_SPHERE: +3.50

## 2025-03-20 ASSESSMENT — KERATOMETRY
OS_AXISANGLE_DEGREES: 008
OD_AXISANGLE_DEGREES: 074
OS_K1POWER_DIOPTERS: 40.25
OD_K2POWER_DIOPTERS: 41.50
OD_K1POWER_DIOPTERS: 41.00
OS_K2POWER_DIOPTERS: 41.00

## 2025-03-20 ASSESSMENT — CORNEAL DYSTROPHY - POSTERIOR
OD_POSTERIORDYSTROPHY: T GUTTATA
OS_POSTERIORDYSTROPHY: T GUTTATA

## 2025-03-20 ASSESSMENT — PACHYMETRY
OS_CT_UM: 558
OS_CT_CORRECTION: -1
OD_CT_UM: 568
OD_CT_CORRECTION: -1

## 2025-03-20 ASSESSMENT — TONOMETRY
OS_IOP_MMHG: 15
OD_IOP_MMHG: 14

## 2025-03-20 ASSESSMENT — SUPERFICIAL PUNCTATE KERATITIS (SPK)
OD_SPK: 1+
OS_SPK: 1+

## 2025-03-20 ASSESSMENT — CONFRONTATIONAL VISUAL FIELD TEST (CVF)
OS_FINDINGS: FULL
OD_FINDINGS: FULL

## 2025-03-20 ASSESSMENT — LID POSITION - PTOSIS: OS_PTOSIS: LUL 2+
